# Patient Record
Sex: FEMALE | Race: WHITE | NOT HISPANIC OR LATINO | Employment: UNEMPLOYED | URBAN - METROPOLITAN AREA
[De-identification: names, ages, dates, MRNs, and addresses within clinical notes are randomized per-mention and may not be internally consistent; named-entity substitution may affect disease eponyms.]

---

## 2021-05-16 ENCOUNTER — HOSPITAL ENCOUNTER (EMERGENCY)
Facility: HOSPITAL | Age: 21
Discharge: HOME | End: 2021-05-17
Attending: EMERGENCY MEDICINE | Admitting: EMERGENCY MEDICINE

## 2021-05-16 DIAGNOSIS — R10.2 SUPRAPUBIC ABDOMINAL PAIN: ICD-10-CM

## 2021-05-16 DIAGNOSIS — N83.201 CYST OF RIGHT OVARY: ICD-10-CM

## 2021-05-16 DIAGNOSIS — N93.8 DYSFUNCTIONAL UTERINE BLEEDING: Primary | ICD-10-CM

## 2021-05-16 LAB
ALBUMIN SERPL-MCNC: 4.9 G/DL (ref 3.5–5.2)
ALBUMIN/GLOB SERPL: 1.8 G/DL
ALP SERPL-CCNC: 64 U/L (ref 39–117)
ALT SERPL W P-5'-P-CCNC: 72 U/L (ref 1–33)
ANION GAP SERPL CALCULATED.3IONS-SCNC: 11 MMOL/L (ref 5–15)
AST SERPL-CCNC: 33 U/L (ref 1–32)
B-HCG UR QL: NEGATIVE
BACTERIA UR QL AUTO: ABNORMAL /HPF
BASOPHILS # BLD AUTO: 0.08 10*3/MM3 (ref 0–0.2)
BASOPHILS NFR BLD AUTO: 0.7 % (ref 0–1.5)
BILIRUB SERPL-MCNC: 0.4 MG/DL (ref 0–1.2)
BILIRUB UR QL STRIP: NEGATIVE
BUN SERPL-MCNC: 9 MG/DL (ref 6–20)
BUN/CREAT SERPL: 14.1 (ref 7–25)
CALCIUM SPEC-SCNC: 9.5 MG/DL (ref 8.6–10.5)
CHLORIDE SERPL-SCNC: 105 MMOL/L (ref 98–107)
CLARITY UR: ABNORMAL
CO2 SERPL-SCNC: 25 MMOL/L (ref 22–29)
COLOR UR: ABNORMAL
CREAT SERPL-MCNC: 0.64 MG/DL (ref 0.57–1)
DEPRECATED RDW RBC AUTO: 39 FL (ref 37–54)
EOSINOPHIL # BLD AUTO: 0.33 10*3/MM3 (ref 0–0.4)
EOSINOPHIL NFR BLD AUTO: 3 % (ref 0.3–6.2)
ERYTHROCYTE [DISTWIDTH] IN BLOOD BY AUTOMATED COUNT: 12.7 % (ref 12.3–15.4)
GFR SERPL CREATININE-BSD FRML MDRD: 120 ML/MIN/1.73
GLOBULIN UR ELPH-MCNC: 2.7 GM/DL
GLUCOSE SERPL-MCNC: 103 MG/DL (ref 65–99)
GLUCOSE UR STRIP-MCNC: NEGATIVE MG/DL
HCT VFR BLD AUTO: 46.8 % (ref 34–46.6)
HGB BLD-MCNC: 15.9 G/DL (ref 12–15.9)
HGB UR QL STRIP.AUTO: ABNORMAL
HOLD SPECIMEN: NORMAL
HOLD SPECIMEN: NORMAL
HYALINE CASTS UR QL AUTO: ABNORMAL /LPF
IMM GRANULOCYTES # BLD AUTO: 0.05 10*3/MM3 (ref 0–0.05)
IMM GRANULOCYTES NFR BLD AUTO: 0.5 % (ref 0–0.5)
INTERNAL NEGATIVE CONTROL: NEGATIVE
INTERNAL POSITIVE CONTROL: POSITIVE
KETONES UR QL STRIP: NEGATIVE
LEUKOCYTE ESTERASE UR QL STRIP.AUTO: ABNORMAL
LIPASE SERPL-CCNC: 35 U/L (ref 13–60)
LYMPHOCYTES # BLD AUTO: 3.56 10*3/MM3 (ref 0.7–3.1)
LYMPHOCYTES NFR BLD AUTO: 32.4 % (ref 19.6–45.3)
Lab: NORMAL
MCH RBC QN AUTO: 28.9 PG (ref 26.6–33)
MCHC RBC AUTO-ENTMCNC: 34 G/DL (ref 31.5–35.7)
MCV RBC AUTO: 85.1 FL (ref 79–97)
MONOCYTES # BLD AUTO: 0.93 10*3/MM3 (ref 0.1–0.9)
MONOCYTES NFR BLD AUTO: 8.5 % (ref 5–12)
NEUTROPHILS NFR BLD AUTO: 54.9 % (ref 42.7–76)
NEUTROPHILS NFR BLD AUTO: 6.05 10*3/MM3 (ref 1.7–7)
NITRITE UR QL STRIP: NEGATIVE
NRBC BLD AUTO-RTO: 0 /100 WBC (ref 0–0.2)
PH UR STRIP.AUTO: >=9 [PH] (ref 5–8)
PLATELET # BLD AUTO: 413 10*3/MM3 (ref 140–450)
PMV BLD AUTO: 9.5 FL (ref 6–12)
POTASSIUM SERPL-SCNC: 3.7 MMOL/L (ref 3.5–5.2)
PROT SERPL-MCNC: 7.6 G/DL (ref 6–8.5)
PROT UR QL STRIP: ABNORMAL
RBC # BLD AUTO: 5.5 10*6/MM3 (ref 3.77–5.28)
RBC # UR: ABNORMAL /HPF
REF LAB TEST METHOD: ABNORMAL
SODIUM SERPL-SCNC: 141 MMOL/L (ref 136–145)
SP GR UR STRIP: 1.02 (ref 1–1.03)
SQUAMOUS #/AREA URNS HPF: ABNORMAL /HPF
UROBILINOGEN UR QL STRIP: ABNORMAL
WBC # BLD AUTO: 11 10*3/MM3 (ref 3.4–10.8)
WBC UR QL AUTO: ABNORMAL /HPF
WHOLE BLOOD HOLD SPECIMEN: NORMAL
WHOLE BLOOD HOLD SPECIMEN: NORMAL

## 2021-05-16 PROCEDURE — 81001 URINALYSIS AUTO W/SCOPE: CPT | Performed by: EMERGENCY MEDICINE

## 2021-05-16 PROCEDURE — 81025 URINE PREGNANCY TEST: CPT

## 2021-05-16 PROCEDURE — 83690 ASSAY OF LIPASE: CPT | Performed by: EMERGENCY MEDICINE

## 2021-05-16 PROCEDURE — 84702 CHORIONIC GONADOTROPIN TEST: CPT | Performed by: PHYSICIAN ASSISTANT

## 2021-05-16 PROCEDURE — 80053 COMPREHEN METABOLIC PANEL: CPT | Performed by: EMERGENCY MEDICINE

## 2021-05-16 PROCEDURE — 85025 COMPLETE CBC W/AUTO DIFF WBC: CPT | Performed by: EMERGENCY MEDICINE

## 2021-05-16 PROCEDURE — 99283 EMERGENCY DEPT VISIT LOW MDM: CPT

## 2021-05-16 PROCEDURE — 81025 URINE PREGNANCY TEST: CPT | Performed by: EMERGENCY MEDICINE

## 2021-05-16 RX ORDER — SODIUM CHLORIDE 9 MG/ML
10 INJECTION INTRAVENOUS AS NEEDED
Status: DISCONTINUED | OUTPATIENT
Start: 2021-05-16 | End: 2021-05-17 | Stop reason: HOSPADM

## 2021-05-17 ENCOUNTER — APPOINTMENT (OUTPATIENT)
Dept: ULTRASOUND IMAGING | Facility: HOSPITAL | Age: 21
End: 2021-05-17

## 2021-05-17 VITALS
HEART RATE: 88 BPM | SYSTOLIC BLOOD PRESSURE: 112 MMHG | DIASTOLIC BLOOD PRESSURE: 72 MMHG | TEMPERATURE: 98 F | OXYGEN SATURATION: 99 % | BODY MASS INDEX: 30.36 KG/M2 | WEIGHT: 165 LBS | HEIGHT: 62 IN | RESPIRATION RATE: 20 BRPM

## 2021-05-17 LAB
HCG INTACT+B SERPL-ACNC: <0.5 MIU/ML
HOLD SPECIMEN: NORMAL

## 2021-05-17 PROCEDURE — 76830 TRANSVAGINAL US NON-OB: CPT

## 2021-05-17 PROCEDURE — 93976 VASCULAR STUDY: CPT

## 2021-05-17 RX ORDER — KETOROLAC TROMETHAMINE 30 MG/ML
30 INJECTION, SOLUTION INTRAMUSCULAR; INTRAVENOUS ONCE
Status: DISCONTINUED | OUTPATIENT
Start: 2021-05-17 | End: 2021-05-17 | Stop reason: HOSPADM

## 2021-05-21 ENCOUNTER — OFFICE VISIT (OUTPATIENT)
Dept: OBSTETRICS AND GYNECOLOGY | Facility: CLINIC | Age: 21
End: 2021-05-21

## 2021-05-21 VITALS
SYSTOLIC BLOOD PRESSURE: 106 MMHG | BODY MASS INDEX: 30.77 KG/M2 | HEIGHT: 62 IN | DIASTOLIC BLOOD PRESSURE: 70 MMHG | WEIGHT: 167.2 LBS | RESPIRATION RATE: 14 BRPM

## 2021-05-21 DIAGNOSIS — N93.9 ABNORMAL UTERINE BLEEDING (AUB): ICD-10-CM

## 2021-05-21 DIAGNOSIS — N92.6 IRREGULAR PERIODS/MENSTRUAL CYCLES: ICD-10-CM

## 2021-05-21 DIAGNOSIS — N83.201 RIGHT OVARIAN CYST: Primary | ICD-10-CM

## 2021-05-21 PROCEDURE — 99203 OFFICE O/P NEW LOW 30 MIN: CPT | Performed by: OBSTETRICS & GYNECOLOGY

## 2021-05-21 NOTE — PROGRESS NOTES
Subjective   Shannen Layton is a 20 y.o. female is here today as a self referral.    Chief Complaint   Patient presents with   • Pelvic Pain     Patient states she was having lower abdominal pain was seen in the ER on Saturday and diagnosed with a right ovarian cyst.    • Menstrual Problem     Periods a irregular with mild cramping.        History of Present Illness  Patient has had irregular periods for some months.  Her menarche was age 14.  Her cycles were regular for a while.  Patient is sexually active is not using birth control.  She expresses desire to become pregnant.  Patient has had abdominal pain for several weeks.  It has become worse in the last 2 weeks and was severe on 5/16/2021.  Patient was seen in the ED.  She underwent transvaginal ultrasound which was basically normal, however, it did show a dominant follicle of the right ovary.  The beta-hCG was negative.  Patient has experienced abnormal vaginal bleeding for about 5 weeks prior to her ER visit.  Patient presents today for work-up.  She does not seem interested in starting hormone suppression therapy i.e. birth control pills.  The following portions of the patient's history were reviewed and updated as appropriate: allergies, current medications, past family history, past medical history, past social history, past surgical history and problem list.    Review of Systems - History obtained from the patient  General ROS: negative  Psychological ROS: negative  ENT ROS: negative  Allergy and Immunology ROS: negative  Hematological and Lymphatic ROS: negative  Endocrine ROS: negative  Breast ROS: negative for breast lumps  Respiratory ROS: no cough, shortness of breath, or wheezing  Cardiovascular ROS: no chest pain or dyspnea on exertion  Gastrointestinal ROS: no abdominal pain, change in bowel habits, or black or bloody stools  Genito-Urinary ROS: no dysuria, trouble voiding, or hematuria  Musculoskeletal ROS: negative  Neurological ROS: no TIA or  stroke symptoms  Dermatological ROS: negative     Objective   General:  well developed; well nourished  no acute distress   Skin:  No suspicious lesions seen   Thyroid: not examined   Breasts:  Not performed.   Abdomen: soft, non-tender; no masses  no umbilical or inguinal hernias are present  no hepato-splenomegaly   Heart: regular rate and rhythm, S1, S2 normal, no murmur, click, rub or gallop   Lungs: clear to auscultation   Pelvis: Clinical staff was present for exam  External genitalia:  normal appearance of the external genitalia including Bartholin's and Brandermill's glands.  :  urethral meatus normal;  Vaginal:  normal pink mucosa without prolapse or lesions. blood present -  small amount;  Cervix:  normal appearance. blood is seen coming from external cervical os;  Uterus:  normal size, shape and consistency. anteverted;  Adnexa:  normal bimanual exam of the adnexa.  Rectal:  digital rectal exam not performed; anus visually normal appearing.         Assessment/Plan   Diagnoses and all orders for this visit:    1. Right ovarian cyst (Primary)  -     US Non-ob Transvaginal; Future    2. Irregular periods/menstrual cycles    3. Abnormal uterine bleeding (AUB)      Return in 4 weeks for repeat ultrasound to evaluate right ovarian cyst  Patient refuses hormone suppressive therapy  Continue expectant management  NSAIDs for abdominal pain    Marcio London MD

## 2021-07-09 ENCOUNTER — OFFICE VISIT (OUTPATIENT)
Dept: OBSTETRICS AND GYNECOLOGY | Facility: CLINIC | Age: 21
End: 2021-07-09

## 2021-07-09 VITALS
DIASTOLIC BLOOD PRESSURE: 68 MMHG | BODY MASS INDEX: 31.69 KG/M2 | HEIGHT: 62 IN | RESPIRATION RATE: 14 BRPM | WEIGHT: 172.2 LBS | SYSTOLIC BLOOD PRESSURE: 106 MMHG

## 2021-07-09 DIAGNOSIS — N92.6 IRREGULAR PERIODS/MENSTRUAL CYCLES: ICD-10-CM

## 2021-07-09 DIAGNOSIS — N83.201 RIGHT OVARIAN CYST: Primary | ICD-10-CM

## 2021-07-09 PROCEDURE — 99212 OFFICE O/P EST SF 10 MIN: CPT | Performed by: OBSTETRICS & GYNECOLOGY

## 2021-07-09 NOTE — PROGRESS NOTES
Subjective   Shannen Layton is a 20 y.o. female is here today for follow-up.    Chief Complaint   Patient presents with   • Follow-up     Patient is here today to follow up on right ovarian cystdiscuss ultrasound         History of Present Illness  Patient returns today for transvaginal ultrasound which was done at [facility].  The report is not available at the present time.  Patient continues to have irregular cycles.  Her last menstrual period however started on 7/4/2021 and has lasted for 5 days.  Patient is trying to become pregnant for the last 11 months.  She will make an appointment for 7/15/2021 to discuss today's ultrasound report.  She may be interested in ovulation drugs if her cycles continue to be irregular.  The following portions of the patient's history were reviewed and updated as appropriate: allergies, current medications, past family history, past medical history, past social history, past surgical history and problem list.    Review of Systems - Negative except for present illness    Objective   General:  well developed; well nourished  no acute distress   Skin:  Not performed.   Thyroid: not examined   Breasts:  Not performed.   Abdomen: Not performed.   Heart: regular rate and rhythm, S1, S2 normal, no murmur, click, rub or gallop   Lungs: clear to auscultation   Pelvis: Not performed.         Assessment/Plan   Diagnoses and all orders for this visit:    1. Right ovarian cyst (Primary)    2. Irregular periods/menstrual cycles        Return on 7/15/2021    Marcio London MD

## 2021-07-15 ENCOUNTER — OFFICE VISIT (OUTPATIENT)
Dept: OBSTETRICS AND GYNECOLOGY | Facility: CLINIC | Age: 21
End: 2021-07-15

## 2021-07-15 VITALS
RESPIRATION RATE: 14 BRPM | DIASTOLIC BLOOD PRESSURE: 72 MMHG | BODY MASS INDEX: 31.69 KG/M2 | SYSTOLIC BLOOD PRESSURE: 108 MMHG | WEIGHT: 172.2 LBS | HEIGHT: 62 IN

## 2021-07-15 DIAGNOSIS — N83.201 RIGHT OVARIAN CYST: ICD-10-CM

## 2021-07-15 DIAGNOSIS — N92.6 IRREGULAR PERIODS/MENSTRUAL CYCLES: Primary | ICD-10-CM

## 2021-07-15 PROCEDURE — 99212 OFFICE O/P EST SF 10 MIN: CPT | Performed by: OBSTETRICS & GYNECOLOGY

## 2021-07-15 NOTE — PROGRESS NOTES
Problem: Patient Care Overview (Adult)  Goal: Plan of Care Review  Outcome: Ongoing (interventions implemented as appropriate)   02/05/18 1651   Coping/Psychosocial Response Interventions   Plan Of Care Reviewed With patient   Patient Care Overview   Progress improving   Outcome Evaluation   Outcome Summary/Follow up Plan pt vss. Pt working on pain management     Goal: Adult Individualization and Mutuality  Outcome: Ongoing (interventions implemented as appropriate)    Goal: Discharge Needs Assessment  Outcome: Ongoing (interventions implemented as appropriate)      Problem: Perioperative Period (Adult)  Goal: Signs and Symptoms of Listed Potential Problems Will be Absent or Manageable (Perioperative Period)  Outcome: Ongoing (interventions implemented as appropriate)      Problem: Pain, Acute (Adult)  Goal: Identify Related Risk Factors and Signs and Symptoms  Outcome: Outcome(s) achieved Date Met: 02/05/18    Goal: Acceptable Pain Control/Comfort Level  Outcome: Ongoing (interventions implemented as appropriate)         Subjective   Shannen Layton is a 20 y.o. female is here today for follow-up.    Chief Complaint   Patient presents with   • Follow-up     Discuss ultrasound        History of Present Illness  Patient returns to follow-up on ultrasound done on 4/8/2021.  She has had an ovarian cyst on the right ovary.  Patient was seen at that day but the ultrasound report was not available.  Today the report was reviewed.  The ovary contained a small cyst that was present on an ultrasound done in May.  The patient was finishing a menstrual period at that time and her endometrium was thin.  Patient is trying to get pregnant but is having irregular cycles.  The following portions of the patient's history were reviewed and updated as appropriate: allergies, current medications, past family history, past medical history, past social history, past surgical history and problem list.    Review of Systems - Negative except for present illness    Objective   General:  well developed; well nourished  no acute distress   Skin:  Not performed.   Thyroid: not examined   Breasts:  Not performed.   Abdomen: Not performed.   Heart: regular rate and rhythm, S1, S2 normal, no murmur, click, rub or gallop   Lungs: clear to auscultation   Pelvis: Not performed.         Assessment/Plan   Diagnoses and all orders for this visit:    1. Irregular periods/menstrual cycles (Primary)    2. Right ovarian cyst      Ultrasound was reviewed and discussed with the patient  Today the endometrium appears to be in the proliferative phase and the right ovary contains a 2 x 2 cm cyst  Return in 2 weeks to reassess      Marcio London MD

## 2021-07-29 ENCOUNTER — OFFICE VISIT (OUTPATIENT)
Dept: OBSTETRICS AND GYNECOLOGY | Facility: CLINIC | Age: 21
End: 2021-07-29

## 2021-07-29 VITALS
SYSTOLIC BLOOD PRESSURE: 120 MMHG | RESPIRATION RATE: 14 BRPM | HEIGHT: 62 IN | DIASTOLIC BLOOD PRESSURE: 74 MMHG | BODY MASS INDEX: 31.43 KG/M2 | WEIGHT: 170.8 LBS

## 2021-07-29 DIAGNOSIS — N92.6 IRREGULAR PERIODS/MENSTRUAL CYCLES: Primary | ICD-10-CM

## 2021-07-29 PROCEDURE — 99212 OFFICE O/P EST SF 10 MIN: CPT | Performed by: OBSTETRICS & GYNECOLOGY

## 2021-07-29 RX ORDER — PNV NO.95/FERROUS FUM/FOLIC AC 28MG-0.8MG
1 TABLET ORAL DAILY
Qty: 30 TABLET | Refills: 11 | Status: SHIPPED | OUTPATIENT
Start: 2021-07-29

## 2021-07-29 NOTE — PROGRESS NOTES
Subjective   Shannen Layton is a 20 y.o. female is here today for follow-up.    Chief Complaint   Patient presents with   • Follow-up     Patient is here today for 2 week follow up, c/o RLQ pain        History of Present Illness  Patient wants to start Clomid to see if it will help her ovulate.  She expects her cycle to start next week.  A prescription was Clomid was sent to her pharmacy.  She was instructed to take this day 3 through 7.  She will have intercourse every other day starting on day 10 of her cycle.  If the patient does not start a cycle she will call.  A serum progesterone and CBC was ordered today.  The results will be called when they return tomorrow.  Patient is complaining of left breast tenderness.  She will be treated with vitamins to see if this improves and decrease caffeine.  The following portions of the patient's history were reviewed and updated as appropriate: allergies, current medications, past family history, past medical history, past social history, past surgical history and problem list.    Review of Systems - Negative      Objective   General:  well developed; well nourished  no acute distress   Skin:  Not performed.   Thyroid: not examined   Breasts:  Not performed.   Abdomen: Not performed.   Heart: regular rate and rhythm, S1, S2 normal, no murmur, click, rub or gallop   Lungs: clear to auscultation   Pelvis: Not performed.         Assessment/Plan   Diagnoses and all orders for this visit:    1. Irregular periods/menstrual cycles (Primary)  -     CBC (No Diff); Future  -     Progesterone; Future  -     clomiPHENE (CLOMID) 50 MG tablet; Take 1 tablet by mouth Daily.  Dispense: 5 tablet; Refill: 2  -     Prenatal Vit-Fe Fumarate-FA (Prenatal Vitamin) 27-0.8 MG tablet; Take 1 tablet by mouth Daily.  Dispense: 30 tablet; Refill: 11      Patient will call if her cycle does not start  If cycle starts next week she will initiate Clomid on day 3 through 7    Marcio London MD

## 2021-08-24 ENCOUNTER — LAB (OUTPATIENT)
Dept: LAB | Facility: HOSPITAL | Age: 21
End: 2021-08-24

## 2021-08-24 ENCOUNTER — TRANSCRIBE ORDERS (OUTPATIENT)
Dept: LAB | Facility: HOSPITAL | Age: 21
End: 2021-08-24

## 2021-08-24 DIAGNOSIS — Z32.01 PREGNANCY EXAMINATION OR TEST, POSITIVE RESULT: Primary | ICD-10-CM

## 2021-08-24 DIAGNOSIS — Z32.01 PREGNANCY EXAMINATION OR TEST, POSITIVE RESULT: ICD-10-CM

## 2021-08-24 LAB
HCG INTACT+B SERPL-ACNC: NORMAL MIU/ML
PROGEST SERPL-MCNC: 8.58 NG/ML

## 2021-08-24 PROCEDURE — 36415 COLL VENOUS BLD VENIPUNCTURE: CPT

## 2021-08-24 PROCEDURE — 84144 ASSAY OF PROGESTERONE: CPT

## 2021-08-24 PROCEDURE — 84702 CHORIONIC GONADOTROPIN TEST: CPT

## 2021-08-30 ENCOUNTER — LAB (OUTPATIENT)
Dept: LAB | Facility: HOSPITAL | Age: 21
End: 2021-08-30

## 2021-08-30 ENCOUNTER — TRANSCRIBE ORDERS (OUTPATIENT)
Dept: LAB | Facility: HOSPITAL | Age: 21
End: 2021-08-30

## 2021-08-30 DIAGNOSIS — Z34.81 PRENATAL CARE, SUBSEQUENT PREGNANCY, FIRST TRIMESTER: Primary | ICD-10-CM

## 2021-08-30 DIAGNOSIS — Z34.81 PRENATAL CARE, SUBSEQUENT PREGNANCY, FIRST TRIMESTER: ICD-10-CM

## 2021-08-30 DIAGNOSIS — Z3A.28 28 WEEKS GESTATION OF PREGNANCY: ICD-10-CM

## 2021-08-30 LAB
ABO GROUP BLD: NORMAL
AMPHET+METHAMPHET UR QL: NEGATIVE
AMPHETAMINES UR QL: NEGATIVE
BARBITURATES UR QL SCN: NEGATIVE
BASOPHILS # BLD AUTO: 0.06 10*3/MM3 (ref 0–0.2)
BASOPHILS NFR BLD AUTO: 0.5 % (ref 0–1.5)
BENZODIAZ UR QL SCN: NEGATIVE
BILIRUB UR QL STRIP: NEGATIVE
BILIRUB UR QL STRIP: NEGATIVE
BLD GP AB SCN SERPL QL: NEGATIVE
BUPRENORPHINE SERPL-MCNC: NEGATIVE NG/ML
CANNABINOIDS SERPL QL: NEGATIVE
CLARITY UR: CLEAR
CLARITY UR: CLEAR
COCAINE UR QL: NEGATIVE
COLOR UR: YELLOW
COLOR UR: YELLOW
DEPRECATED RDW RBC AUTO: 37.7 FL (ref 37–54)
EOSINOPHIL # BLD AUTO: 0.2 10*3/MM3 (ref 0–0.4)
EOSINOPHIL NFR BLD AUTO: 1.6 % (ref 0.3–6.2)
ERYTHROCYTE [DISTWIDTH] IN BLOOD BY AUTOMATED COUNT: 12.2 % (ref 12.3–15.4)
GLUCOSE SERPL-MCNC: 72 MG/DL (ref 65–99)
GLUCOSE UR STRIP-MCNC: NEGATIVE MG/DL
GLUCOSE UR STRIP-MCNC: NEGATIVE MG/DL
HBV SURFACE AG SERPL QL IA: NORMAL
HCT VFR BLD AUTO: 44.9 % (ref 34–46.6)
HCV AB SER DONR QL: NORMAL
HGB BLD-MCNC: 15.3 G/DL (ref 12–15.9)
HGB UR QL STRIP.AUTO: NEGATIVE
HGB UR QL STRIP.AUTO: NEGATIVE
HIV1+2 AB SER QL: NORMAL
IMM GRANULOCYTES # BLD AUTO: 0.09 10*3/MM3 (ref 0–0.05)
IMM GRANULOCYTES NFR BLD AUTO: 0.7 % (ref 0–0.5)
KETONES UR QL STRIP: NEGATIVE
KETONES UR QL STRIP: NEGATIVE
LEUKOCYTE ESTERASE UR QL STRIP.AUTO: NEGATIVE
LEUKOCYTE ESTERASE UR QL STRIP.AUTO: NEGATIVE
LYMPHOCYTES # BLD AUTO: 3.18 10*3/MM3 (ref 0.7–3.1)
LYMPHOCYTES NFR BLD AUTO: 26.1 % (ref 19.6–45.3)
MCH RBC QN AUTO: 29.1 PG (ref 26.6–33)
MCHC RBC AUTO-ENTMCNC: 34.1 G/DL (ref 31.5–35.7)
MCV RBC AUTO: 85.5 FL (ref 79–97)
METHADONE UR QL SCN: NEGATIVE
MONOCYTES # BLD AUTO: 0.83 10*3/MM3 (ref 0.1–0.9)
MONOCYTES NFR BLD AUTO: 6.8 % (ref 5–12)
NEUTROPHILS NFR BLD AUTO: 64.3 % (ref 42.7–76)
NEUTROPHILS NFR BLD AUTO: 7.81 10*3/MM3 (ref 1.7–7)
NITRITE UR QL STRIP: NEGATIVE
NITRITE UR QL STRIP: NEGATIVE
NRBC BLD AUTO-RTO: 0 /100 WBC (ref 0–0.2)
OPIATES UR QL: POSITIVE
OXYCODONE UR QL SCN: NEGATIVE
PCP UR QL SCN: NEGATIVE
PH UR STRIP.AUTO: 5.5 [PH] (ref 5–8)
PH UR STRIP.AUTO: 5.5 [PH] (ref 5–8)
PLATELET # BLD AUTO: 383 10*3/MM3 (ref 140–450)
PMV BLD AUTO: 9.9 FL (ref 6–12)
PROGEST SERPL-MCNC: 9.24 NG/ML
PROPOXYPH UR QL: NEGATIVE
PROT UR QL STRIP: NEGATIVE
PROT UR QL STRIP: NEGATIVE
RBC # BLD AUTO: 5.25 10*6/MM3 (ref 3.77–5.28)
RH BLD: POSITIVE
SP GR UR STRIP: 1.02 (ref 1–1.03)
SP GR UR STRIP: 1.02 (ref 1–1.03)
TRICYCLICS UR QL SCN: NEGATIVE
TSH SERPL DL<=0.05 MIU/L-ACNC: 0.91 UIU/ML (ref 0.27–4.2)
UROBILINOGEN UR QL STRIP: NORMAL
UROBILINOGEN UR QL STRIP: NORMAL
WBC # BLD AUTO: 12.17 10*3/MM3 (ref 3.4–10.8)

## 2021-08-30 PROCEDURE — 80081 OBSTETRIC PANEL INC HIV TSTG: CPT

## 2021-08-30 PROCEDURE — 81003 URINALYSIS AUTO W/O SCOPE: CPT

## 2021-08-30 PROCEDURE — 84144 ASSAY OF PROGESTERONE: CPT

## 2021-08-30 PROCEDURE — 80306 DRUG TEST PRSMV INSTRMNT: CPT

## 2021-08-30 PROCEDURE — 86803 HEPATITIS C AB TEST: CPT

## 2021-08-30 PROCEDURE — 36415 COLL VENOUS BLD VENIPUNCTURE: CPT

## 2021-08-30 PROCEDURE — 84443 ASSAY THYROID STIM HORMONE: CPT

## 2021-08-30 PROCEDURE — 82947 ASSAY GLUCOSE BLOOD QUANT: CPT

## 2021-08-31 LAB — RPR SER QL: NORMAL

## 2021-09-01 LAB — RUBV IGG SERPL IA-ACNC: 6.26 INDEX

## 2021-09-29 ENCOUNTER — LAB (OUTPATIENT)
Dept: LAB | Facility: HOSPITAL | Age: 21
End: 2021-09-29

## 2021-09-29 ENCOUNTER — TRANSCRIBE ORDERS (OUTPATIENT)
Dept: LAB | Facility: HOSPITAL | Age: 21
End: 2021-09-29

## 2021-09-29 DIAGNOSIS — Z34.81 PRENATAL CARE, SUBSEQUENT PREGNANCY, FIRST TRIMESTER: ICD-10-CM

## 2021-09-29 DIAGNOSIS — Z3A.12 12 WEEKS GESTATION OF PREGNANCY: Primary | ICD-10-CM

## 2021-09-29 DIAGNOSIS — Z3A.12 12 WEEKS GESTATION OF PREGNANCY: ICD-10-CM

## 2021-09-29 LAB
AMPHET+METHAMPHET UR QL: NEGATIVE
AMPHETAMINES UR QL: NEGATIVE
BARBITURATES UR QL SCN: NEGATIVE
BENZODIAZ UR QL SCN: NEGATIVE
BUPRENORPHINE SERPL-MCNC: NEGATIVE NG/ML
CANNABINOIDS SERPL QL: NEGATIVE
COCAINE UR QL: NEGATIVE
METHADONE UR QL SCN: NEGATIVE
OPIATES UR QL: NEGATIVE
OXYCODONE UR QL SCN: NEGATIVE
PCP UR QL SCN: NEGATIVE
PROPOXYPH UR QL: NEGATIVE
TRICYCLICS UR QL SCN: NEGATIVE

## 2021-09-29 PROCEDURE — 80306 DRUG TEST PRSMV INSTRMNT: CPT

## 2021-10-26 ENCOUNTER — TELEPHONE (OUTPATIENT)
Dept: OBSTETRICS AND GYNECOLOGY | Facility: CLINIC | Age: 21
End: 2021-10-26

## 2021-10-26 NOTE — TELEPHONE ENCOUNTER
Spoke with patient and advised her that Dr. London had ordered lab work on her back in July and we were wanting to remind her to get them done.  Patient stated that she wasn't sure if she was going to do them or not and she would give the office a call back and let us know.

## 2021-11-03 ENCOUNTER — TELEPHONE (OUTPATIENT)
Dept: OBSTETRICS AND GYNECOLOGY | Facility: HOSPITAL | Age: 21
End: 2021-11-03

## 2021-11-03 NOTE — TELEPHONE ENCOUNTER
1st attempt to contact Shannen to introduce [employer] program.  Unable to leave message.

## 2021-11-05 NOTE — TELEPHONE ENCOUNTER
Introduced Shannen to [employer] program.  She would like to talk to  before she decided if she wants to participate.    Contact information provided, encouraged to call anytime if she has questions, concerns, or needs assistance with resources.  STEPHANIE.

## 2022-02-09 ENCOUNTER — LAB (OUTPATIENT)
Dept: LAB | Facility: HOSPITAL | Age: 22
End: 2022-02-09

## 2022-02-09 ENCOUNTER — TRANSCRIBE ORDERS (OUTPATIENT)
Dept: LAB | Facility: HOSPITAL | Age: 22
End: 2022-02-09

## 2022-02-09 DIAGNOSIS — N92.6 IRREGULAR PERIODS/MENSTRUAL CYCLES: ICD-10-CM

## 2022-02-09 DIAGNOSIS — Z3A.28 28 WEEKS GESTATION OF PREGNANCY: ICD-10-CM

## 2022-02-09 DIAGNOSIS — Z3A.28 28 WEEKS GESTATION OF PREGNANCY: Primary | ICD-10-CM

## 2022-02-09 LAB
BLD GP AB SCN SERPL QL: NEGATIVE
DEPRECATED RDW RBC AUTO: 40 FL (ref 37–54)
ERYTHROCYTE [DISTWIDTH] IN BLOOD BY AUTOMATED COUNT: 12.7 % (ref 12.3–15.4)
GLUCOSE 1H P 100 G GLC PO SERPL-MCNC: 167 MG/DL (ref 65–139)
HCT VFR BLD AUTO: 38.6 % (ref 34–46.6)
HGB BLD-MCNC: 13.1 G/DL (ref 12–15.9)
MCH RBC QN AUTO: 29.4 PG (ref 26.6–33)
MCHC RBC AUTO-ENTMCNC: 33.9 G/DL (ref 31.5–35.7)
MCV RBC AUTO: 86.7 FL (ref 79–97)
PLATELET # BLD AUTO: 267 10*3/MM3 (ref 140–450)
PMV BLD AUTO: 9.7 FL (ref 6–12)
RBC # BLD AUTO: 4.45 10*6/MM3 (ref 3.77–5.28)
WBC NRBC COR # BLD: 10.9 10*3/MM3 (ref 3.4–10.8)

## 2022-02-09 PROCEDURE — 82950 GLUCOSE TEST: CPT

## 2022-02-09 PROCEDURE — 36415 COLL VENOUS BLD VENIPUNCTURE: CPT

## 2022-02-09 PROCEDURE — 86850 RBC ANTIBODY SCREEN: CPT

## 2022-02-09 PROCEDURE — 84144 ASSAY OF PROGESTERONE: CPT

## 2022-02-09 PROCEDURE — 85027 COMPLETE CBC AUTOMATED: CPT

## 2022-02-10 ENCOUNTER — TELEPHONE (OUTPATIENT)
Dept: OBSTETRICS AND GYNECOLOGY | Facility: CLINIC | Age: 22
End: 2022-02-10

## 2022-02-10 LAB — PROGEST SERPL-MCNC: >60 NG/ML

## 2022-03-16 ENCOUNTER — HOSPITAL ENCOUNTER (INPATIENT)
Facility: HOSPITAL | Age: 22
LOS: 2 days | Discharge: HOME | End: 2022-03-18
Attending: OBSTETRICS & GYNECOLOGY | Admitting: OBSTETRICS & GYNECOLOGY

## 2022-03-16 LAB
ABO GROUP BLD: NORMAL
ALP SERPL-CCNC: 178 U/L (ref 39–117)
ALT SERPL W P-5'-P-CCNC: 12 U/L (ref 1–33)
AMPHET+METHAMPHET UR QL: NEGATIVE
AMPHETAMINES UR QL: NEGATIVE
AST SERPL-CCNC: 17 U/L (ref 1–32)
BARBITURATES UR QL SCN: NEGATIVE
BENZODIAZ UR QL SCN: NEGATIVE
BILIRUB SERPL-MCNC: 0.2 MG/DL (ref 0–1.2)
BLD GP AB SCN SERPL QL: NEGATIVE
BUPRENORPHINE SERPL-MCNC: NEGATIVE NG/ML
CANNABINOIDS SERPL QL: NEGATIVE
COCAINE UR QL: NEGATIVE
CREAT SERPL-MCNC: 0.54 MG/DL (ref 0.57–1)
DEPRECATED RDW RBC AUTO: 39.3 FL (ref 37–54)
ERYTHROCYTE [DISTWIDTH] IN BLOOD BY AUTOMATED COUNT: 12.9 % (ref 12.3–15.4)
HCT VFR BLD AUTO: 38.2 % (ref 34–46.6)
HGB BLD-MCNC: 13.2 G/DL (ref 12–15.9)
LDH SERPL-CCNC: 206 U/L (ref 135–214)
MCH RBC QN AUTO: 29.3 PG (ref 26.6–33)
MCHC RBC AUTO-ENTMCNC: 34.6 G/DL (ref 31.5–35.7)
MCV RBC AUTO: 84.9 FL (ref 79–97)
METHADONE UR QL SCN: NEGATIVE
OPIATES UR QL: POSITIVE
OXYCODONE UR QL SCN: NEGATIVE
PCP UR QL SCN: NEGATIVE
PLATELET # BLD AUTO: 268 10*3/MM3 (ref 140–450)
PMV BLD AUTO: 9.5 FL (ref 6–12)
POC AMNISURE: POSITIVE
PROPOXYPH UR QL: NEGATIVE
RBC # BLD AUTO: 4.5 10*6/MM3 (ref 3.77–5.28)
RH BLD: POSITIVE
SARS-COV-2 RDRP RESP QL NAA+PROBE: NORMAL
T&S EXPIRATION DATE: NORMAL
TRICYCLICS UR QL SCN: NEGATIVE
URATE SERPL-MCNC: 4.5 MG/DL (ref 2.4–5.7)
WBC NRBC COR # BLD: 13.36 10*3/MM3 (ref 3.4–10.8)

## 2022-03-16 PROCEDURE — 85027 COMPLETE CBC AUTOMATED: CPT | Performed by: OBSTETRICS & GYNECOLOGY

## 2022-03-16 PROCEDURE — 86900 BLOOD TYPING SEROLOGIC ABO: CPT | Performed by: OBSTETRICS & GYNECOLOGY

## 2022-03-16 PROCEDURE — 84450 TRANSFERASE (AST) (SGOT): CPT | Performed by: OBSTETRICS & GYNECOLOGY

## 2022-03-16 PROCEDURE — 82247 BILIRUBIN TOTAL: CPT | Performed by: OBSTETRICS & GYNECOLOGY

## 2022-03-16 PROCEDURE — 80307 DRUG TEST PRSMV CHEM ANLYZR: CPT | Performed by: OBSTETRICS & GYNECOLOGY

## 2022-03-16 PROCEDURE — 25010000002 PENICILLIN G POTASSIUM PER 600000 UNITS: Performed by: OBSTETRICS & GYNECOLOGY

## 2022-03-16 PROCEDURE — 84075 ASSAY ALKALINE PHOSPHATASE: CPT | Performed by: OBSTETRICS & GYNECOLOGY

## 2022-03-16 PROCEDURE — 86901 BLOOD TYPING SEROLOGIC RH(D): CPT | Performed by: OBSTETRICS & GYNECOLOGY

## 2022-03-16 PROCEDURE — 3E033VJ INTRODUCTION OF OTHER HORMONE INTO PERIPHERAL VEIN, PERCUTANEOUS APPROACH: ICD-10-PCS | Performed by: OBSTETRICS & GYNECOLOGY

## 2022-03-16 PROCEDURE — 87635 SARS-COV-2 COVID-19 AMP PRB: CPT | Performed by: OBSTETRICS & GYNECOLOGY

## 2022-03-16 PROCEDURE — 86850 RBC ANTIBODY SCREEN: CPT | Performed by: OBSTETRICS & GYNECOLOGY

## 2022-03-16 PROCEDURE — 80306 DRUG TEST PRSMV INSTRMNT: CPT | Performed by: OBSTETRICS & GYNECOLOGY

## 2022-03-16 PROCEDURE — 84460 ALANINE AMINO (ALT) (SGPT): CPT | Performed by: OBSTETRICS & GYNECOLOGY

## 2022-03-16 PROCEDURE — 82565 ASSAY OF CREATININE: CPT | Performed by: OBSTETRICS & GYNECOLOGY

## 2022-03-16 PROCEDURE — 84112 EVAL AMNIOTIC FLUID PROTEIN: CPT | Performed by: OBSTETRICS & GYNECOLOGY

## 2022-03-16 PROCEDURE — 83615 LACTATE (LD) (LDH) ENZYME: CPT | Performed by: OBSTETRICS & GYNECOLOGY

## 2022-03-16 PROCEDURE — 59025 FETAL NON-STRESS TEST: CPT

## 2022-03-16 PROCEDURE — 0KQM0ZZ REPAIR PERINEUM MUSCLE, OPEN APPROACH: ICD-10-PCS | Performed by: OBSTETRICS & GYNECOLOGY

## 2022-03-16 PROCEDURE — 84550 ASSAY OF BLOOD/URIC ACID: CPT | Performed by: OBSTETRICS & GYNECOLOGY

## 2022-03-16 RX ORDER — BUTORPHANOL TARTRATE 1 MG/ML
1 INJECTION, SOLUTION INTRAMUSCULAR; INTRAVENOUS
Status: DISCONTINUED | OUTPATIENT
Start: 2022-03-16 | End: 2022-03-16

## 2022-03-16 RX ORDER — PRENATAL VIT/IRON FUM/FOLIC AC 27MG-0.8MG
1 TABLET ORAL DAILY
Status: DISCONTINUED | OUTPATIENT
Start: 2022-03-16 | End: 2022-03-18 | Stop reason: HOSPADM

## 2022-03-16 RX ORDER — OXYTOCIN 10 [USP'U]/ML
INJECTION, SOLUTION INTRAMUSCULAR; INTRAVENOUS
Status: COMPLETED
Start: 2022-03-16 | End: 2022-03-16

## 2022-03-16 RX ORDER — IBUPROFEN 600 MG/1
600 TABLET ORAL EVERY 6 HOURS PRN
Status: DISCONTINUED | OUTPATIENT
Start: 2022-03-16 | End: 2022-03-18 | Stop reason: HOSPADM

## 2022-03-16 RX ORDER — HYDROCORTISONE 25 MG/G
1 CREAM TOPICAL AS NEEDED
Status: DISCONTINUED | OUTPATIENT
Start: 2022-03-16 | End: 2022-03-18 | Stop reason: HOSPADM

## 2022-03-16 RX ORDER — ONDANSETRON 2 MG/ML
4 INJECTION INTRAMUSCULAR; INTRAVENOUS EVERY 6 HOURS PRN
Status: DISCONTINUED | OUTPATIENT
Start: 2022-03-16 | End: 2022-03-16

## 2022-03-16 RX ORDER — CARBOPROST TROMETHAMINE 250 UG/ML
250 INJECTION, SOLUTION INTRAMUSCULAR AS NEEDED
Status: DISCONTINUED | OUTPATIENT
Start: 2022-03-16 | End: 2022-03-18 | Stop reason: HOSPADM

## 2022-03-16 RX ORDER — CARBOPROST TROMETHAMINE 250 UG/ML
250 INJECTION, SOLUTION INTRAMUSCULAR AS NEEDED
Status: DISCONTINUED | OUTPATIENT
Start: 2022-03-16 | End: 2022-03-16 | Stop reason: SDUPTHER

## 2022-03-16 RX ORDER — DOCUSATE SODIUM 100 MG/1
100 CAPSULE, LIQUID FILLED ORAL 2 TIMES DAILY
Status: DISCONTINUED | OUTPATIENT
Start: 2022-03-16 | End: 2022-03-18 | Stop reason: HOSPADM

## 2022-03-16 RX ORDER — PENICILLIN G 3000000 [IU]/50ML
3 INJECTION, SOLUTION INTRAVENOUS EVERY 4 HOURS
Status: DISCONTINUED | OUTPATIENT
Start: 2022-03-16 | End: 2022-03-16

## 2022-03-16 RX ORDER — ERYTHROMYCIN 5 MG/G
OINTMENT OPHTHALMIC
Status: DISCONTINUED
Start: 2022-03-16 | End: 2022-03-18 | Stop reason: HOSPADM

## 2022-03-16 RX ORDER — CALCIUM CARBONATE 200(500)MG
1 TABLET,CHEWABLE ORAL 3 TIMES DAILY PRN
Status: DISCONTINUED | OUTPATIENT
Start: 2022-03-16 | End: 2022-03-18 | Stop reason: HOSPADM

## 2022-03-16 RX ORDER — SIMETHICONE 80 MG
80 TABLET,CHEWABLE ORAL 4 TIMES DAILY
Status: DISCONTINUED | OUTPATIENT
Start: 2022-03-16 | End: 2022-03-18 | Stop reason: HOSPADM

## 2022-03-16 RX ORDER — OXYTOCIN/0.9 % SODIUM CHLORIDE 30/500 ML
2-20 PLASTIC BAG, INJECTION (ML) INTRAVENOUS
Status: DISCONTINUED | OUTPATIENT
Start: 2022-03-16 | End: 2022-03-16

## 2022-03-16 RX ORDER — DIPHENHYDRAMINE HCL 25 MG
25 CAPSULE ORAL NIGHTLY PRN
Status: DISCONTINUED | OUTPATIENT
Start: 2022-03-16 | End: 2022-03-18 | Stop reason: HOSPADM

## 2022-03-16 RX ORDER — OXYCODONE HYDROCHLORIDE AND ACETAMINOPHEN 5; 325 MG/1; MG/1
1 TABLET ORAL EVERY 4 HOURS PRN
Status: DISCONTINUED | OUTPATIENT
Start: 2022-03-16 | End: 2022-03-18 | Stop reason: HOSPADM

## 2022-03-16 RX ORDER — SODIUM CHLORIDE 0.9 % (FLUSH) 0.9 %
1-10 SYRINGE (ML) INJECTION AS NEEDED
Status: DISCONTINUED | OUTPATIENT
Start: 2022-03-16 | End: 2022-03-18 | Stop reason: HOSPADM

## 2022-03-16 RX ORDER — METHYLERGONOVINE MALEATE 0.2 MG/ML
200 INJECTION INTRAVENOUS ONCE AS NEEDED
Status: DISCONTINUED | OUTPATIENT
Start: 2022-03-16 | End: 2022-03-16 | Stop reason: SDUPTHER

## 2022-03-16 RX ORDER — SODIUM CHLORIDE, SODIUM LACTATE, POTASSIUM CHLORIDE, CALCIUM CHLORIDE 600; 310; 30; 20 MG/100ML; MG/100ML; MG/100ML; MG/100ML
125 INJECTION, SOLUTION INTRAVENOUS CONTINUOUS
Status: DISCONTINUED | OUTPATIENT
Start: 2022-03-16 | End: 2022-03-16

## 2022-03-16 RX ORDER — ONDANSETRON 4 MG/1
4 TABLET, FILM COATED ORAL EVERY 8 HOURS PRN
Status: DISCONTINUED | OUTPATIENT
Start: 2022-03-16 | End: 2022-03-18 | Stop reason: HOSPADM

## 2022-03-16 RX ORDER — OXYTOCIN 10 [USP'U]/ML
INJECTION, SOLUTION INTRAMUSCULAR; INTRAVENOUS
Status: DISCONTINUED
Start: 2022-03-16 | End: 2022-03-18 | Stop reason: HOSPADM

## 2022-03-16 RX ORDER — MAGNESIUM CARB/ALUMINUM HYDROX 105-160MG
30 TABLET,CHEWABLE ORAL ONCE
Status: DISCONTINUED | OUTPATIENT
Start: 2022-03-16 | End: 2022-03-16

## 2022-03-16 RX ORDER — SODIUM CHLORIDE 0.9 % (FLUSH) 0.9 %
10 SYRINGE (ML) INJECTION AS NEEDED
Status: DISCONTINUED | OUTPATIENT
Start: 2022-03-16 | End: 2022-03-16 | Stop reason: HOSPADM

## 2022-03-16 RX ORDER — ONDANSETRON 4 MG/1
4 TABLET, FILM COATED ORAL EVERY 6 HOURS PRN
Status: DISCONTINUED | OUTPATIENT
Start: 2022-03-16 | End: 2022-03-16

## 2022-03-16 RX ORDER — METHYLERGONOVINE MALEATE 0.2 MG/ML
200 INJECTION INTRAVENOUS ONCE AS NEEDED
Status: DISCONTINUED | OUTPATIENT
Start: 2022-03-16 | End: 2022-03-18 | Stop reason: HOSPADM

## 2022-03-16 RX ORDER — SODIUM CHLORIDE 0.9 % (FLUSH) 0.9 %
3 SYRINGE (ML) INJECTION EVERY 12 HOURS SCHEDULED
Status: DISCONTINUED | OUTPATIENT
Start: 2022-03-16 | End: 2022-03-16

## 2022-03-16 RX ORDER — OXYTOCIN/0.9 % SODIUM CHLORIDE 30/500 ML
650 PLASTIC BAG, INJECTION (ML) INTRAVENOUS ONCE
Status: DISCONTINUED | OUTPATIENT
Start: 2022-03-16 | End: 2022-03-18 | Stop reason: HOSPADM

## 2022-03-16 RX ORDER — ACETAMINOPHEN 325 MG/1
650 TABLET ORAL EVERY 4 HOURS PRN
Status: DISCONTINUED | OUTPATIENT
Start: 2022-03-16 | End: 2022-03-16 | Stop reason: HOSPADM

## 2022-03-16 RX ORDER — MISOPROSTOL 200 UG/1
800 TABLET ORAL AS NEEDED
Status: DISCONTINUED | OUTPATIENT
Start: 2022-03-16 | End: 2022-03-16 | Stop reason: SDUPTHER

## 2022-03-16 RX ORDER — ONDANSETRON 2 MG/ML
4 INJECTION INTRAMUSCULAR; INTRAVENOUS EVERY 6 HOURS PRN
Status: DISCONTINUED | OUTPATIENT
Start: 2022-03-16 | End: 2022-03-18 | Stop reason: HOSPADM

## 2022-03-16 RX ORDER — PROMETHAZINE HYDROCHLORIDE 12.5 MG/1
12.5 TABLET ORAL EVERY 4 HOURS PRN
Status: DISCONTINUED | OUTPATIENT
Start: 2022-03-16 | End: 2022-03-18 | Stop reason: HOSPADM

## 2022-03-16 RX ORDER — LIDOCAINE HYDROCHLORIDE 10 MG/ML
INJECTION, SOLUTION INFILTRATION; PERINEURAL
Status: DISCONTINUED
Start: 2022-03-16 | End: 2022-03-18 | Stop reason: HOSPADM

## 2022-03-16 RX ORDER — LIDOCAINE HYDROCHLORIDE 10 MG/ML
5 INJECTION, SOLUTION EPIDURAL; INFILTRATION; INTRACAUDAL; PERINEURAL AS NEEDED
Status: DISCONTINUED | OUTPATIENT
Start: 2022-03-16 | End: 2022-03-16

## 2022-03-16 RX ORDER — MISOPROSTOL 200 UG/1
800 TABLET ORAL AS NEEDED
Status: DISCONTINUED | OUTPATIENT
Start: 2022-03-16 | End: 2022-03-18 | Stop reason: HOSPADM

## 2022-03-16 RX ORDER — OXYTOCIN 10 [USP'U]/ML
10 INJECTION, SOLUTION INTRAMUSCULAR; INTRAVENOUS ONCE
Status: CANCELLED | OUTPATIENT
Start: 2022-03-16 | End: 2022-03-16

## 2022-03-16 RX ORDER — OXYTOCIN/0.9 % SODIUM CHLORIDE 30/500 ML
85 PLASTIC BAG, INJECTION (ML) INTRAVENOUS ONCE
Status: DISCONTINUED | OUTPATIENT
Start: 2022-03-16 | End: 2022-03-18 | Stop reason: HOSPADM

## 2022-03-16 RX ADMIN — SODIUM CHLORIDE, POTASSIUM CHLORIDE, SODIUM LACTATE AND CALCIUM CHLORIDE 125 ML/HR: 600; 310; 30; 20 INJECTION, SOLUTION INTRAVENOUS at 08:50

## 2022-03-16 RX ADMIN — SODIUM CHLORIDE 5 MILLION UNITS: 9 INJECTION, SOLUTION INTRAVENOUS at 08:57

## 2022-03-16 RX ADMIN — OXYTOCIN 10 UNITS: 10 INJECTION, SOLUTION INTRAMUSCULAR; INTRAVENOUS at 14:57

## 2022-03-16 RX ADMIN — SODIUM CHLORIDE 3 MILLION UNITS: 9 INJECTION, SOLUTION INTRAVENOUS at 12:24

## 2022-03-16 NOTE — PLAN OF CARE
Problem: Adult Inpatient Plan of Care  Goal: Plan of Care Review  Outcome: Ongoing, Progressing  Goal: Patient-Specific Goal (Individualized)  Outcome: Ongoing, Progressing  Goal: Absence of Hospital-Acquired Illness or Injury  Outcome: Ongoing, Progressing  Intervention: Identify and Manage Fall Risk  Recent Flowsheet Documentation  Taken 3/16/2022 1515 by Cassie Felix RN  Safety Promotion/Fall Prevention:   safety round/check completed   clutter free environment maintained  Taken 3/16/2022 0730 by Cassie Felix RN  Safety Promotion/Fall Prevention:   safety round/check completed   clutter free environment maintained  Intervention: Prevent Skin Injury  Recent Flowsheet Documentation  Taken 3/16/2022 1515 by Cassie Felix RN  Body Position: supine  Taken 3/16/2022 0730 by Cassie Felix RN  Body Position: supine  Intervention: Prevent and Manage VTE (Venous Thromboembolism) Risk  Recent Flowsheet Documentation  Taken 3/16/2022 1515 by Cassie Felix RN  Activity Management: activity adjusted per tolerance  Taken 3/16/2022 0730 by Cassie Felix RN  Activity Management: activity adjusted per tolerance  Goal: Optimal Comfort and Wellbeing  Outcome: Ongoing, Progressing  Intervention: Provide Person-Centered Care  Recent Flowsheet Documentation  Taken 3/16/2022 1515 by Cassie Felix RN  Trust Relationship/Rapport:   care explained   choices provided   questions answered   questions encouraged   reassurance provided   thoughts/feelings acknowledged  Taken 3/16/2022 0730 by Cassie Felix RN  Trust Relationship/Rapport:   care explained   choices provided   emotional support provided   questions answered   questions encouraged   thoughts/feelings acknowledged  Goal: Readiness for Transition of Care  Outcome: Ongoing, Progressing     Problem: Bleeding (Labor)  Goal: Hemostasis  Outcome: Ongoing, Progressing     Problem: Change in Fetal Wellbeing (Labor)  Goal: Stable Fetal Wellbeing  Outcome: Ongoing,  Progressing  Intervention: Promote and Monitor Fetal Wellbeing  Recent Flowsheet Documentation  Taken 3/16/2022 1515 by Cassie Felix RN  Body Position: supine  Taken 3/16/2022 0730 by Cassie Felix RN  Body Position: supine     Problem: Delayed Labor Progression (Labor)  Goal: Effective Progression to Delivery  Outcome: Ongoing, Progressing     Problem: Infection (Labor)  Goal: Absence of Infection Signs and Symptoms  Outcome: Ongoing, Progressing     Problem: Labor Pain (Labor)  Goal: Acceptable Pain Control  Outcome: Ongoing, Progressing     Problem: Uterine Tachysystole (Labor)  Goal: Normal Uterine Contraction Pattern  Outcome: Ongoing, Progressing   Goal Outcome Evaluation:

## 2022-03-16 NOTE — L&D DELIVERY NOTE
[facility]  Vaginal Delivery Note  Review the Delivery Report for details.       Delivery     Delivery: Vaginal, Spontaneous     YOB: 2021    Time of Birth:  Gestational Age 2:46 PM   35w5d     Anesthesia:      Delivering clinician: Jessi Argueta    Forceps?   No   Vacuum? No    Shoulder dystocia present: No        Delivery narrative:  The patient progressed to complete and delivered a VFI  with Apagrs 8/9 the weight is  2790g via  withOUT epidural anesthesia. Shoulders were delivered easily. The cord was clamped and cut after 45 second delay and the infant was placed on the mother's chest for skin- to - skin. Cord blood and segment were obtained and the placenta was delivered spontaneously intact. 10 units of IM Pitocin was given as pt had lost her IV just prior to delivery. Uterine tone was appropriate.   2d degree lacerations was noted and repaired with2-0 Vicryl.   The patient tolerated the procedure well and remained in the LDR for recovery. All counts correct.    Infant    Findings: female  infant  2790 g (6 lb 2.4 oz)     Apgars: 8  @ 1 minute /    9  @ 5 minutes         Placenta, Cord, and Fluid    Placenta delivered  Spontaneous  at   3/16/2022  2:49 PM     Cord:   present.   Nuchal Cord?  no   Cord blood obtained:     Cord gases obtained:      Cord gas results: Venous:  No results found for: PHCVEN    Arterial:  No results found for: PHCART     Repair    Episiotomy: Not recorded     No    Lacerations: Yes  Laceration Information  Laceration Repaired?   Perineal:    2d degree   Periurethral:       Labial:       Sulcus:       Vaginal:       Cervical:         Suture used for repair: 2-0 Vicryl     Quantitative Blood Loss:     125ml         Complications  none    Disposition  Mother to Mother Baby/Postpartum  in stable condition currently.  Baby to remains with mom  in stable condition currently.      Jessi Argueta MD  22  15:10 EDT

## 2022-03-16 NOTE — H&P
VERONIKA Carmona  Obstetric History and Physical    CC: Water broke    SUBJECTIVE:     Patient is a 21 y.o. female  currently at 35w5d, who presents with ROM at 0300 this am. Pregnancy has been uncomplicated. Did fail 1 hr GCT but passed 3 hr GTT. Recent US was reassuring with EFW 5#10.   +FM no VB is feeling contractions      Prenatal Information:  Prenatal Results     POC Urine Glucose/Protein     Test Value Reference Range Date Time    Urine Glucose        Urine Protein              Initial Prenatal Labs     Test Value Reference Range Date Time    Hemoglobin  13.2 g/dL 12.0 - 15.9 22 0754       13.1 g/dL 12.0 - 15.9 22 1017       15.3 g/dL 12.0 - 15.9 21 1513    Hematocrit  38.2 % 34.0 - 46.6 22 0754       38.6 % 34.0 - 46.6 22 1017       44.9 % 34.0 - 46.6 21 1513    Platelets  268 10*3/mm3 140 - 450 22 0754       267 10*3/mm3 140 - 450 22 1017       383 10*3/mm3 140 - 450 21 1513    Rubella IgG  6.26 index Immune >0.99 21 1513    Hepatitis B SAg  Non-Reactive  Non-Reactive 21 1513    Hepatitis C Ab  Non-Reactive  Non-Reactive 21 1513    RPR  Non-Reactive  Non-Reactive 21 1513    ABO  B   21 1513    Rh  Positive   21 1513    Antibody Screen  Negative   22 1017       Negative   21 1513    HIV  Non-Reactive  Non-Reactive 21 1513    Urine Culture        Gonorrhea        Chlamydia        TSH  0.911 uIU/mL 0.270 - 4.200 21 1513    HgB A1c               2nd and 3rd Trimester     Test Value Reference Range Date Time    Hemoglobin (repeated)  13.2 g/dL 12.0 - 15.9 22 0754       13.1 g/dL 12.0 - 15.9 22 1017    Hematocrit (repeated)  38.2 % 34.0 - 46.6 22 0754       38.6 % 34.0 - 46.6 22 1017    Platelets   268 10*3/mm3 140 - 450 22 0754       267 10*3/mm3 140 - 450 22 1017       383 10*3/mm3 140 - 450 21 1513    GCT  167 mg/dL 65 - 139 22 1017    Antibody  Screen (repeated)  Negative   02/09/22 1017    GTT Fasting        GTT 1 Hr        GTT 2 Hr        GTT 3 Hr        Group B Strep              Drug Screening     Test Value Reference Range Date Time    Amphetamine Screen  Negative  Negative 09/29/21 1304       Negative  Negative 08/30/21 1513    Barbiturate Screen  Negative  Negative 09/29/21 1304       Negative  Negative 08/30/21 1513    Benzodiazepine Screen  Negative  Negative 09/29/21 1304       Negative  Negative 08/30/21 1513    Methadone Screen  Negative  Negative 09/29/21 1304       Negative  Negative 08/30/21 1513    Phencyclidine Screen  Negative  Negative 09/29/21 1304       Negative  Negative 08/30/21 1513    Opiates Screen  Negative  Negative 09/29/21 1304       Positive  Negative 08/30/21 1513    THC Screen  Negative  Negative 09/29/21 1304       Negative  Negative 08/30/21 1513    Cocaine Screen  Negative  Negative 09/29/21 1304       Negative  Negative 08/30/21 1513    Propoxyphene Screen  Negative  Negative 09/29/21 1304       Negative  Negative 08/30/21 1513    Buprenorphine Screen  Negative  Negative 09/29/21 1304       Negative  Negative 08/30/21 1513    Methamphetamine Screen  Negative  Negative 09/29/21 1304       Negative  Negative 08/30/21 1513    Oxycodone Screen  Negative  Negative 09/29/21 1304       Negative  Negative 08/30/21 1513    Tricyclic Antidepressants Screen  Negative  Negative 09/29/21 1304       Negative  Negative 08/30/21 1513          Other (Risk screening)     Test Value Reference Range Date Time    Varicella IgG        Parvovirus IgG        CMV IgG        Cystic Fibrosis        Hemoglobin electrophoresis        NIPT        MSAFP-4        AFP (for NTD only)              Legend    ^: Historical                      External Prenatal Results     Pregnancy Outside Results - Transcribed From Office Records - See Scanned Records For Details     Test Value Date Time    ABO  B  08/30/21 1513    Rh  Positive  08/30/21 1513    Antibody  Screen  Negative  02/09/22 1017       Negative  08/30/21 1513    Varicella IgG       Rubella  6.26 index 08/30/21 1513    Hgb  13.2 g/dL 03/16/22 0754       13.1 g/dL 02/09/22 1017       15.3 g/dL 08/30/21 1513    Hct  38.2 % 03/16/22 0754       38.6 % 02/09/22 1017       44.9 % 08/30/21 1513    Glucose Fasting GTT       Glucose Tolerance Test 1 hour       Glucose Tolerance Test 3 hour       Gonorrhea (discrete)       Chlamydia (discrete)       RPR  Non-Reactive  08/30/21 1513    VDRL       Syphilis Antibody       HBsAg  Non-Reactive  08/30/21 1513    Herpes Simplex Virus PCR       Herpes Simplex VIrus Culture       HIV  Non-Reactive  08/30/21 1513    Hep C RNA Quant PCR       Hep C Antibody  Non-Reactive  08/30/21 1513    AFP       Group B Strep       GBS Susceptibility to Clindamycin       GBS Susceptibility to Erythromycin       Fetal Fibronectin       Genetic Testing, Maternal Blood             Drug Screening     Test Value Date Time    Urine Drug Screen       Amphetamine Screen  Negative  09/29/21 1304       Negative  08/30/21 1513    Barbiturate Screen  Negative  09/29/21 1304       Negative  08/30/21 1513    Benzodiazepine Screen  Negative  09/29/21 1304       Negative  08/30/21 1513    Methadone Screen  Negative  09/29/21 1304       Negative  08/30/21 1513    Phencyclidine Screen  Negative  09/29/21 1304       Negative  08/30/21 1513    Opiates Screen  Negative  09/29/21 1304       Positive  08/30/21 1513    THC Screen  Negative  09/29/21 1304       Negative  08/30/21 1513    Cocaine Screen       Propoxyphene Screen  Negative  09/29/21 1304       Negative  08/30/21 1513    Buprenorphine Screen  Negative  09/29/21 1304       Negative  08/30/21 1513    Methamphetamine Screen       Oxycodone Screen  Negative  09/29/21 1304       Negative  08/30/21 1513    Tricyclic Antidepressants Screen  Negative  09/29/21 1304       Negative  08/30/21 1513          Legend    ^: Historical                         OB History:                      OB History    Para Term  AB Living   1 0 0 0 0 0   SAB IAB Ectopic Molar Multiple Live Births   0 0 0 0 0 0      # Outcome Date GA Lbr Cornelius/2nd Weight Sex Delivery Anes PTL Lv   1 Current               Allergies:                      No Known Allergies   Past Medical History: Past Medical History:   Diagnosis Date   • Migraines    • Ovarian cyst       Past Surgical History History reviewed. No pertinent surgical history.   Family History: History reviewed. No pertinent family history.   Social History:  reports that she has never smoked. She has never used smokeless tobacco.   reports no history of alcohol use.  Drug use questions deferred to the physician.    General ROS: Pertinent items are noted in HPI, all other systems reviewed and negative   Medications: Prenatal Vitamin, clomiPHENE, and diclofenac       Objective       Vital Signs Range for the last 24 hours  Temperature: Temp:  [98.1 °F (36.7 °C)-98.9 °F (37.2 °C)] 98.1 °F (36.7 °C)   Temp Source: Temp src: Oral   BP: BP: (127-131)/(80-83) 127/83   Pulse: Heart Rate:  [96-98] 98   Respirations: Resp:  [18] 18   SPO2:     O2 Amount (l/min):     O2 Devices     Weight: Weight:  [81.6 kg (180 lb)] 81.6 kg (180 lb)     Physical Examination: General appearance - alert, well appearing, and in no distress  Chest - clear to auscultation, no wheezes, rales or rhonchi, symmetric air entry  Heart - normal rate, regular rhythm, normal S1, S2, no murmurs, rubs, clicks or gallops  Abdomen - Soft, gravid, nontender  Extremities - pedal edema tr +    Presentation: vtx     Cervix: Exam by: Method: sterile exam per physician Jase)   Dilation: Cervical Dilation (cm): 3-4   Effacement: Cervical Effacement: 100%   Station: Fetal Station: 2-->-1, 0       Fetal Heart Rate Assessment   Method: Fetal HR Assessment Method: external   Beats/min: Fetal HR (beats/min): 150   Baseline: Fetal HR Baseline: normal range   Variability: Fetal HR Variability:  minimal (detectable, amplitude less than or equal to 5 bpm)   Accels: Fetal HR Accelerations: greater than/equal to 15 bpm, lasting at least 15 seconds   Decels: Fetal HR Decelerations: absent   Tracing Category:       Uterine Assessment   Method: Method: external tocotransducer   Frequency (min): Contraction Frequency (Minutes): 3-4         Laboratory Results:  WBC   Date Value Ref Range Status   2022 13.36 (H) 3.40 - 10.80 10*3/mm3 Final     RBC   Date Value Ref Range Status   2022 4.50 3.77 - 5.28 10*6/mm3 Final     Hemoglobin   Date Value Ref Range Status   2022 13.2 12.0 - 15.9 g/dL Final     Hematocrit   Date Value Ref Range Status   2022 38.2 34.0 - 46.6 % Final     MCV   Date Value Ref Range Status   2022 84.9 79.0 - 97.0 fL Final     MCH   Date Value Ref Range Status   2022 29.3 26.6 - 33.0 pg Final     MCHC   Date Value Ref Range Status   2022 34.6 31.5 - 35.7 g/dL Final     RDW   Date Value Ref Range Status   2022 12.9 12.3 - 15.4 % Final     RDW-SD   Date Value Ref Range Status   2022 39.3 37.0 - 54.0 fl Final     MPV   Date Value Ref Range Status   2022 9.5 6.0 - 12.0 fL Final     Platelets   Date Value Ref Range Status   2022 268 140 - 450 10*3/mm3 Final           Assessment/Plan:   IUP 35w5d with PPROM    1. Admitted to L&D for PPROM at 35w. Expectant mgmt for now, making progress  2. GBS unknown, , on PCN  3. FWB reassuring  4. Desires natural labor, encouraged to move around room, use shower. Ok to use Novie monitor        Jessi Argueta MD  3/16/2022  08:50 EDT

## 2022-03-17 RX ADMIN — WITCH HAZEL 1 PAD: 500 SOLUTION RECTAL; TOPICAL at 12:26

## 2022-03-17 RX ADMIN — Medication: at 12:26

## 2022-03-17 RX ADMIN — DOCUSATE SODIUM 100 MG: 100 CAPSULE, LIQUID FILLED ORAL at 08:18

## 2022-03-17 RX ADMIN — PRENATAL VITAMINS-IRON FUMARATE 27 MG IRON-FOLIC ACID 0.8 MG TABLET 1 TABLET: at 08:18

## 2022-03-17 NOTE — LACTATION NOTE
03/17/22 0805   Maternal Information   Date of Referral 03/17/22   Person Making Referral other (see comments)  (courtesy)   Maternal Reason for Referral no prior breastfeeding experience  (SLP ordered.  Mom states baby is not sucking well at breast, small shield given.  Encouraged 10 minutes bilaterally and then supplementing (if ordered or desired) and pumping.)   Infant Reason for Referral 35-37 weeks gestation   Maternal Assessment   Breast Size Issue none   Breast Shape Bilateral:;round   Breast Density Bilateral:;soft   Nipples Bilateral:;short;graspable   Left Nipple Symptoms intact   Right Nipple Symptoms intact   Maternal Infant Feeding   Maternal Emotional State relaxed;receptive   Milk Expression/Equipment   Breast Pump Type double electric, personal   Breast Pump Flange Type hard   Breast Pump Flange Size 24 mm   Breast Pumping   Breast Pumping Interventions post-feed pumping encouraged   Breast Pumping double electric breast pump utilized

## 2022-03-17 NOTE — PROGRESS NOTES
VERONIKA Carmona  Obstetric Progress Note    Chief Complaint: PPD 1    Subjective   Feeling well. Pain is controlled. Lochia appr. lit diet. +amb/void    Objective     Vital Signs Range for the last 24 hours  Temp:  [97.7 °F (36.5 °C)-98.9 °F (37.2 °C)] 98.9 °F (37.2 °C)   BP: (102-135)/(55-76) 102/55   Heart Rate:  [] 89   Resp:  [16-18] 16   SpO2:  [98 %] 98 %               Intake/Output last 24 hours:      Intake/Output Summary (Last 24 hours) at 3/17/2022 0830  Last data filed at 3/16/2022 1446  Gross per 24 hour   Intake --   Output 125 ml   Net -125 ml       Intake/Output this shift:    No intake/output data recorded.    Physical Exam:  General: Patient is comfortable, well appearing and in no acute distress   Heart CVS exam: normal rate and regular rhythm.   Lungs Chest: clear to auscultation, no wheezes, rales or rhonchi, symmetric air entry.     Abdomen Soft, non tender, fundus firm   Extremities Exam of extremities: pedal edema tr +       Laboratory Results:    WBC   Date Value Ref Range Status   2022 13.36 (H) 3.40 - 10.80 10*3/mm3 Final     RBC   Date Value Ref Range Status   2022 4.50 3.77 - 5.28 10*6/mm3 Final     Hemoglobin   Date Value Ref Range Status   2022 13.2 12.0 - 15.9 g/dL Final     Hematocrit   Date Value Ref Range Status   2022 38.2 34.0 - 46.6 % Final     MCV   Date Value Ref Range Status   2022 84.9 79.0 - 97.0 fL Final     MCH   Date Value Ref Range Status   2022 29.3 26.6 - 33.0 pg Final     MCHC   Date Value Ref Range Status   2022 34.6 31.5 - 35.7 g/dL Final     RDW   Date Value Ref Range Status   2022 12.9 12.3 - 15.4 % Final     RDW-SD   Date Value Ref Range Status   2022 39.3 37.0 - 54.0 fl Final     MPV   Date Value Ref Range Status   2022 9.5 6.0 - 12.0 fL Final     Platelets   Date Value Ref Range Status   2022 268 140 - 450 10*3/mm3 Final     hct pending this am    Assessment/Plan: PPD 1 s/p   1. RPPC: codi  well advance care  2. Breast: lactation support  3. UDS + for opiates: SW is consulted. Pt denies use. Confirmation is pending  4. Possible dc home tomorrow          Jessi Argueta MD  3/17/2022  08:30 EDT

## 2022-03-17 NOTE — CASE MANAGEMENT/SOCIAL WORK
Continued Stay Note  [facility]     Patient Name: Shannen Layton  MRN: [id number]  Today's Date: 3/17/2022    Admit Date: 3/16/2022     Discharge Plan     Row Name 03/17/22 1040       Plan    Plan MSW available    Plan Comments Visited pt. Pt had + UDS in 8/2021 and at delivery for opiates. She denies any substance misuse. She reports she ate an everything bagel on the morning of delivery.  This very well may be a false positive. Awaiting confirmation testing on pts UDS and cord stat results.    Final Discharge Disposition Code 01 - home or self-care               Discharge Codes    No documentation.                     Kristan Elder, MSW

## 2022-03-18 VITALS
SYSTOLIC BLOOD PRESSURE: 117 MMHG | WEIGHT: 180 LBS | TEMPERATURE: 98.4 F | OXYGEN SATURATION: 98 % | HEIGHT: 62 IN | DIASTOLIC BLOOD PRESSURE: 71 MMHG | RESPIRATION RATE: 18 BRPM | BODY MASS INDEX: 33.13 KG/M2 | HEART RATE: 91 BPM

## 2022-03-18 RX ORDER — IBUPROFEN 600 MG/1
600 TABLET ORAL EVERY 6 HOURS PRN
Qty: 60 TABLET | Refills: 1 | Status: SHIPPED | OUTPATIENT
Start: 2022-03-18

## 2022-03-18 RX ORDER — DOCUSATE SODIUM 100 MG/1
100 CAPSULE, LIQUID FILLED ORAL 2 TIMES DAILY
Qty: 60 CAPSULE | Refills: 1 | Status: SHIPPED | OUTPATIENT
Start: 2022-03-18

## 2022-03-18 NOTE — PAYOR COMM NOTE
"Shannen Shore (21 y.o. Female)     Auth#[id number]    Discharged 3/18/22 with Baby.    From: Marli Rivers LPN, Utilization Review  Phone #[phone]  Fax #[phone]              Date of Birth   2000    Social Security Number       Address   [address] [place] [address]    Home Phone   [phone]    MRN   [id number]       Hoahaoism   Uats[employer]    Marital Status                               Admission Date   3/16/22    Admission Type   Elective    Admitting Provider   Marisel Logan MD    Attending Provider       Department, Room/Bed   [facility] MOTHER BABY 4A, N414/1       Discharge Date   3/18/2022    Discharge Disposition   Home or Self Care    Discharge Destination                               Attending Provider: (none)   Allergies: No Known Allergies    Isolation: None   Infection: None   Code Status: CPR   Advance Care Planning Activity    Ht: 157.5 cm (62\")   Wt: 81.6 kg (180 lb)    Admission Cmt: None   Principal Problem: None                Active Insurance as of 3/16/2022     Primary Coverage     Payor Plan Insurance Group Employer/Plan Group    [facility] COMMUNITY PLAN [facility] COMMUNITY PLAN [facility] [place]     Payor Plan Address Payor Plan Phone Number Payor Plan Fax Number Effective Dates    [address]   5/1/2021 - None Entered    [facility] [id number]       Subscriber Name Subscriber Birth Date Member ID       SHANNEN SHORE 2000 [id number]                 Emergency Contacts      (Rel.) Home Phone Work Phone Mobile Phone    CalinWilly (Spouse) -- -- [phone]    winifred shore (Relative) [phone] -- --               Operative/Procedure Notes (last 7 days)      Jessi Argutea MD at 03/16/22 1510          [facility]  Vaginal Delivery Note  Review the Delivery Report for details.       Delivery     Delivery: Vaginal, Spontaneous     YOB: 2021    Time of Birth:  Gestational Age 2:46 PM   35w5d   "   Anesthesia:      Delivering clinician: Jessi Argueta    Forceps?   No   Vacuum? No    Shoulder dystocia present: No        Delivery narrative:  The patient progressed to complete and delivered a VFI  with Apagrs  the weight is  2790g via  withOUT epidural anesthesia. Shoulders were delivered easily. The cord was clamped and cut after 45 second delay and the infant was placed on the mother's chest for skin- to - skin. Cord blood and segment were obtained and the placenta was delivered spontaneously intact. 10 units of IM Pitocin was given as pt had lost her IV just prior to delivery. Uterine tone was appropriate.   2d degree lacerations was noted and repaired with2-0 Vicryl.   The patient tolerated the procedure well and remained in the LDR for recovery. All counts correct.    Infant    Findings: female  infant  2790 g (6 lb 2.4 oz)     Apgars: 8  @ 1 minute /    9  @ 5 minutes         Placenta, Cord, and Fluid    Placenta delivered  Spontaneous  at   3/16/2022  2:49 PM     Cord:   present.   Nuchal Cord?  no   Cord blood obtained:     Cord gases obtained:      Cord gas results: Venous:  No results found for: PHCVEN    Arterial:  No results found for: PHCART     Repair    Episiotomy: Not recorded     No    Lacerations: Yes  Laceration Information  Laceration Repaired?   Perineal:    2d degree   Periurethral:       Labial:       Sulcus:       Vaginal:       Cervical:         Suture used for repair: 2-0 Vicryl     Quantitative Blood Loss:     125ml         Complications  none    Disposition  Mother to Mother Baby/Postpartum  in stable condition currently.  Baby to remains with mom  in stable condition currently.      Jessi Argueta MD  22  15:10 EDT        Electronically signed by Jessi Argueta MD at 22 [phone]          Discharge Summary      Jessi Argueta MD at 22 2852          [facility]  Discharge Summary      Patient: Shannen  Calin            : 2000  MRN: [id number]  [facility]: [id number]  Consult:   Consults     No orders found from 2/15/2022 to 3/17/2022.             Gestational Age at Discharge:  35w5d      Admission  Diagnosis: <principal problem not specified>    Discharge Diagnosis:   Patient Active Problem List   Diagnosis   •  premature rupture of membranes (PPROM) with onset of labor within 24 hours of rupture in third trimester, antepartum       Date of Admission: 3/16/2022    Date of Discharge:   3/18/2022    Procedures:            Service:  Obstetrics    Hospital Course: Patient was admitted at 35 weeks 5 days after  rupture of membranes.  She was managed expectantly and her labor progressed without complication.  She delivered a viable female infant with Apgars 8 and 9 weighing 6 pounds 2.4 ounces via spontaneous vaginal delivery see note for full detail.  Her postpartum course was uncomplicated and she was meeting criteria for discharge on postpartum day 2.  Of note her urine drug screen was positive for opiates but was felt to be a false positive due to consumption of poppy seeds on the day of delivery.  Social work was consulted and followed the patient during the postpartum course.  A cord stat is pending    Labs:    Lab Results (last 24 hours)     ** No results found for the last 24 hours. **        HOSPITAL LABS:  Lab Results   Component Value Date    WBC 13.36 (H) 2022    HGB 13.2 2022    HCT 38.2 2022    MCV 84.9 2022     2022    URICACID 4.5 2022    AST 17 2022    ALT 12 2022     2022     Results from last 7 days   Lab Units 22  0754   ABO TYPING  B   RH TYPING  Positive   ANTIBODY SCREEN  Negative       IMAGING        Discharge Medications     Discharge Medications      New Medications      Instructions Start Date   docusate sodium 100 MG capsule   100 mg, Oral, 2 Times Daily      ibuprofen 600 MG tablet  Commonly  known as: ADVIL,MOTRIN   600 mg, Oral, Every 6 Hours PRN         Continue These Medications      Instructions Start Date   Prenatal Vitamin 27-0.8 MG tablet   1 tablet, Oral, Daily         Stop These Medications    clomiPHENE 50 MG tablet  Commonly known as: CLOMID     diclofenac 50 MG EC tablet  Commonly known as: VOLTAREN            Allergies: No Known Allergies     Discharge Disposition:  To Home    Discharge Condition:  Stable    Discharge Diet: Regular    Activity at Discharge:  pelvic rest    Follow-up Appointments: 6 wk or prn        Jessi Argueta MD  03/18/22  08:37 EDT      Electronically signed by Jessi Argueta MD at 03/18/22 0814

## 2022-03-18 NOTE — PROGRESS NOTES
[facility]  Obstetric Progress Note    Chief Complaint: PPD 2    Subjective   Feeling well. Pain is controlled. Lochia appr. lit diet. +amb/void    Objective     Vital Signs Range for the last 24 hours  Temp:  [97.8 °F (36.6 °C)-99.1 °F (37.3 °C)] 99.1 °F (37.3 °C)   BP: (114-126)/(58-68) 126/68   Heart Rate:  [85-94] 94   Resp:  [16-18] 18                   Intake/Output last 24 hours:    No intake or output data in the 24 hours ending 22 0835    Intake/Output this shift:    No intake/output data recorded.    Physical Exam:  General: Patient is comfortable, well appearing and in no acute distress   Heart CVS exam: normal rate and regular rhythm.   Lungs Chest: clear to auscultation, no wheezes, rales or rhonchi, symmetric air entry.     Abdomen Soft, non tender, fundus firm   Extremities Exam of extremities: pedal edema tr +       Laboratory Results:    No new    Assessment/Plan: PPD 2 s/p   1. RPPC: dong well advance care  2. Breast: lactation support  3. UDS + for opiates: SW is consulted. Likely false positive, ok to dc home  4. dc home today          Jessi Argueta MD  3/18/2022  08:35 EDT

## 2022-03-18 NOTE — DISCHARGE SUMMARY
[facility]  Discharge Summary      Patient: Shannen Layton            : 2000  MRN: [id number]  CSN: [id number]  Consult:   Consults     No orders found from 2/15/2022 to 3/17/2022.             Gestational Age at Discharge:  35w5d      Admission  Diagnosis: <principal problem not specified>    Discharge Diagnosis:   Patient Active Problem List   Diagnosis   •  premature rupture of membranes (PPROM) with onset of labor within 24 hours of rupture in third trimester, antepartum       Date of Admission: 3/16/2022    Date of Discharge:   3/18/2022    Procedures:            Service:  Obstetrics    Hospital Course: Patient was admitted at 35 weeks 5 days after  rupture of membranes.  She was managed expectantly and her labor progressed without complication.  She delivered a viable female infant with Apgars 8 and 9 weighing 6 pounds 2.4 ounces via spontaneous vaginal delivery see note for full detail.  Her postpartum course was uncomplicated and she was meeting criteria for discharge on postpartum day 2.  Of note her urine drug screen was positive for opiates but was felt to be a false positive due to consumption of poppy seeds on the day of delivery.  Social work was consulted and followed the patient during the postpartum course.  A cord stat is pending    Labs:    Lab Results (last 24 hours)     ** No results found for the last 24 hours. **        HOSPITAL LABS:  Lab Results   Component Value Date    WBC 13.36 (H) 2022    HGB 13.2 2022    HCT 38.2 2022    MCV 84.9 2022     2022    URICACID 4.5 2022    AST 17 2022    ALT 12 2022     2022     Results from last 7 days   Lab Units 22  0754   ABO TYPING  B   RH TYPING  Positive   ANTIBODY SCREEN  Negative       IMAGING        Discharge Medications     Discharge Medications      New Medications      Instructions Start Date   docusate sodium 100 MG capsule   100 mg, Oral, 2  Times Daily      ibuprofen 600 MG tablet  Commonly known as: ADVIL,MOTRIN   600 mg, Oral, Every 6 Hours PRN         Continue These Medications      Instructions Start Date   Prenatal Vitamin 27-0.8 MG tablet   1 tablet, Oral, Daily         Stop These Medications    clomiPHENE 50 MG tablet  Commonly known as: CLOMID     diclofenac 50 MG EC tablet  Commonly known as: VOLTAREN            Allergies: No Known Allergies     Discharge Disposition:  To Home    Discharge Condition:  Stable    Discharge Diet: Regular    Activity at Discharge:  pelvic rest    Follow-up Appointments: 6 wk or prn        Jessi Argueta MD  03/18/22  08:37 EDT

## 2022-03-21 LAB — REF LAB TEST METHOD: NORMAL
